# Patient Record
Sex: FEMALE | Race: WHITE | Employment: PART TIME | URBAN - NONMETROPOLITAN AREA
[De-identification: names, ages, dates, MRNs, and addresses within clinical notes are randomized per-mention and may not be internally consistent; named-entity substitution may affect disease eponyms.]

---

## 2017-02-15 ENCOUNTER — NURSE TRIAGE (OUTPATIENT)
Dept: ADMINISTRATIVE | Age: 47
End: 2017-02-15

## 2017-02-16 ENCOUNTER — OFFICE VISIT (OUTPATIENT)
Dept: CARDIOLOGY | Age: 47
End: 2017-02-16

## 2017-02-16 VITALS
SYSTOLIC BLOOD PRESSURE: 130 MMHG | DIASTOLIC BLOOD PRESSURE: 76 MMHG | HEIGHT: 61 IN | BODY MASS INDEX: 29.45 KG/M2 | WEIGHT: 156 LBS

## 2017-02-16 DIAGNOSIS — R42 LIGHT HEADED: ICD-10-CM

## 2017-02-16 DIAGNOSIS — R11.0 NAUSEA: ICD-10-CM

## 2017-02-16 DIAGNOSIS — R10.9 ABDOMINAL PAIN, UNSPECIFIED LOCATION: ICD-10-CM

## 2017-02-16 DIAGNOSIS — R06.02 SOB (SHORTNESS OF BREATH): ICD-10-CM

## 2017-02-16 DIAGNOSIS — R00.2 PALPITATION: Primary | ICD-10-CM

## 2017-02-16 PROCEDURE — 99203 OFFICE O/P NEW LOW 30 MIN: CPT | Performed by: NUCLEAR MEDICINE

## 2017-02-16 RX ORDER — PANTOPRAZOLE SODIUM 40 MG/1
40 TABLET, DELAYED RELEASE ORAL DAILY
Qty: 30 TABLET | Refills: 6 | Status: CANCELLED | OUTPATIENT
Start: 2017-02-16

## 2017-02-16 RX ORDER — PANTOPRAZOLE SODIUM 40 MG/1
40 TABLET, DELAYED RELEASE ORAL DAILY
Qty: 30 TABLET | Refills: 6 | Status: SHIPPED | OUTPATIENT
Start: 2017-02-16 | End: 2018-12-31

## 2017-02-16 ASSESSMENT — ENCOUNTER SYMPTOMS
CHEST TIGHTNESS: 0
ABDOMINAL PAIN: 0
ANAL BLEEDING: 0
CONSTIPATION: 0
SHORTNESS OF BREATH: 0
BLOOD IN STOOL: 0
NAUSEA: 0
PHOTOPHOBIA: 0
BACK PAIN: 0
ABDOMINAL DISTENTION: 0
DIARRHEA: 0
FACIAL SWELLING: 0

## 2017-03-16 ENCOUNTER — OFFICE VISIT (OUTPATIENT)
Dept: CARDIOLOGY | Age: 47
End: 2017-03-16

## 2017-03-16 VITALS
HEART RATE: 84 BPM | SYSTOLIC BLOOD PRESSURE: 132 MMHG | HEIGHT: 62 IN | WEIGHT: 159.7 LBS | BODY MASS INDEX: 29.39 KG/M2 | DIASTOLIC BLOOD PRESSURE: 80 MMHG

## 2017-03-16 DIAGNOSIS — R00.2 PALPITATION: Primary | ICD-10-CM

## 2017-03-16 PROCEDURE — 99213 OFFICE O/P EST LOW 20 MIN: CPT | Performed by: NUCLEAR MEDICINE

## 2017-03-16 RX ORDER — FLUOXETINE HYDROCHLORIDE 20 MG/1
20 CAPSULE ORAL DAILY
COMMUNITY
End: 2018-12-31

## 2017-03-16 RX ORDER — FLUTICASONE PROPIONATE 50 MCG
1 SPRAY, SUSPENSION (ML) NASAL DAILY
COMMUNITY
End: 2020-08-28 | Stop reason: ALTCHOICE

## 2017-05-14 ENCOUNTER — NURSE TRIAGE (OUTPATIENT)
Dept: ADMINISTRATIVE | Age: 47
End: 2017-05-14

## 2018-06-14 ENCOUNTER — HOSPITAL ENCOUNTER (OUTPATIENT)
Dept: MAMMOGRAPHY | Age: 48
Discharge: HOME | End: 2018-06-14
Payer: COMMERCIAL

## 2018-06-14 DIAGNOSIS — Z12.39 BREAST CANCER SCREENING: ICD-10-CM

## 2018-06-14 PROCEDURE — 77067 SCR MAMMO BI INCL CAD: CPT

## 2018-06-20 ENCOUNTER — HOSPITAL ENCOUNTER (OUTPATIENT)
Dept: WOMENS IMAGING | Age: 48
Discharge: HOME | End: 2018-06-20
Payer: COMMERCIAL

## 2018-06-20 ENCOUNTER — APPOINTMENT (OUTPATIENT)
Dept: WOMENS IMAGING | Age: 48
End: 2018-06-20
Payer: COMMERCIAL

## 2018-06-20 DIAGNOSIS — R92.2 BREAST DENSITY: ICD-10-CM

## 2018-06-20 PROCEDURE — 77065 DX MAMMO INCL CAD UNI: CPT

## 2018-12-14 ENCOUNTER — HOSPITAL ENCOUNTER (OUTPATIENT)
Dept: WOMENS IMAGING | Age: 48
Discharge: HOME | End: 2018-12-14
Payer: COMMERCIAL

## 2018-12-14 DIAGNOSIS — R92.2 BREAST DENSITY: ICD-10-CM

## 2018-12-14 DIAGNOSIS — Z09 FOLLOW-UP EXAM: ICD-10-CM

## 2018-12-14 PROCEDURE — 77065 DX MAMMO INCL CAD UNI: CPT

## 2018-12-14 PROCEDURE — 76642 ULTRASOUND BREAST LIMITED: CPT

## 2018-12-31 ENCOUNTER — HOSPITAL ENCOUNTER (OUTPATIENT)
Dept: WOMENS IMAGING | Age: 48
Discharge: HOME | End: 2018-12-31
Payer: COMMERCIAL

## 2018-12-31 VITALS
DIASTOLIC BLOOD PRESSURE: 84 MMHG | HEART RATE: 96 BPM | SYSTOLIC BLOOD PRESSURE: 133 MMHG | TEMPERATURE: 98.5 F | RESPIRATION RATE: 18 BRPM

## 2018-12-31 DIAGNOSIS — N63.20 LEFT BREAST MASS: ICD-10-CM

## 2018-12-31 PROCEDURE — A4648 IMPLANTABLE TISSUE MARKER: HCPCS

## 2018-12-31 PROCEDURE — 88305 TISSUE EXAM BY PATHOLOGIST: CPT

## 2018-12-31 PROCEDURE — 2709999900 HC NON-CHARGEABLE SUPPLY

## 2018-12-31 PROCEDURE — 19083 BX BREAST 1ST LESION US IMAG: CPT

## 2018-12-31 PROCEDURE — 19084 BX BREAST ADD LESION US IMAG: CPT

## 2018-12-31 PROCEDURE — 77065 DX MAMMO INCL CAD UNI: CPT

## 2018-12-31 PROCEDURE — C1894 INTRO/SHEATH, NON-LASER: HCPCS

## 2019-02-04 ENCOUNTER — HOSPITAL ENCOUNTER (EMERGENCY)
Age: 49
Discharge: HOME | End: 2019-02-04
Payer: COMMERCIAL

## 2019-02-04 ENCOUNTER — APPOINTMENT (OUTPATIENT)
Dept: GENERAL RADIOLOGY | Age: 49
End: 2019-02-04
Payer: COMMERCIAL

## 2019-02-04 VITALS
SYSTOLIC BLOOD PRESSURE: 144 MMHG | RESPIRATION RATE: 15 BRPM | WEIGHT: 145 LBS | DIASTOLIC BLOOD PRESSURE: 90 MMHG | TEMPERATURE: 98.6 F | BODY MASS INDEX: 25.69 KG/M2 | OXYGEN SATURATION: 99 % | HEIGHT: 63 IN | HEART RATE: 86 BPM

## 2019-02-04 DIAGNOSIS — R10.13 DYSPEPSIA: ICD-10-CM

## 2019-02-04 DIAGNOSIS — R07.9 CHEST PAIN, UNSPECIFIED TYPE: Primary | ICD-10-CM

## 2019-02-04 DIAGNOSIS — F41.9 ANXIETY: ICD-10-CM

## 2019-02-04 LAB
ALBUMIN SERPL-MCNC: 4.7 G/DL (ref 3.5–5.1)
ALP BLD-CCNC: 70 U/L (ref 38–126)
ALT SERPL-CCNC: 10 U/L (ref 11–66)
ANION GAP SERPL CALCULATED.3IONS-SCNC: 13 MEQ/L (ref 8–16)
AST SERPL-CCNC: 12 U/L (ref 5–40)
BASOPHILS # BLD: 0.3 %
BASOPHILS ABSOLUTE: 0 THOU/MM3 (ref 0–0.1)
BILIRUB SERPL-MCNC: 0.7 MG/DL (ref 0.3–1.2)
BILIRUBIN DIRECT: < 0.2 MG/DL (ref 0–0.3)
BUN BLDV-MCNC: 20 MG/DL (ref 7–22)
CALCIUM SERPL-MCNC: 9.4 MG/DL (ref 8.5–10.5)
CHLORIDE BLD-SCNC: 102 MEQ/L (ref 98–111)
CO2: 26 MEQ/L (ref 23–33)
CREAT SERPL-MCNC: 0.6 MG/DL (ref 0.4–1.2)
EKG ATRIAL RATE: 111 BPM
EKG P AXIS: 67 DEGREES
EKG P-R INTERVAL: 150 MS
EKG Q-T INTERVAL: 328 MS
EKG QRS DURATION: 74 MS
EKG QTC CALCULATION (BAZETT): 446 MS
EKG R AXIS: 22 DEGREES
EKG VENTRICULAR RATE: 111 BPM
EOSINOPHIL # BLD: 0.8 %
EOSINOPHILS ABSOLUTE: 0.1 THOU/MM3 (ref 0–0.4)
ERYTHROCYTE [DISTWIDTH] IN BLOOD BY AUTOMATED COUNT: 11.9 % (ref 11.5–14.5)
ERYTHROCYTE [DISTWIDTH] IN BLOOD BY AUTOMATED COUNT: 38.1 FL (ref 35–45)
GFR SERPL CREATININE-BSD FRML MDRD: > 90 ML/MIN/1.73M2
GLUCOSE BLD-MCNC: 112 MG/DL (ref 70–108)
HCT VFR BLD CALC: 39.5 % (ref 37–47)
HEMOGLOBIN: 13 GM/DL (ref 12–16)
IMMATURE GRANS (ABS): 0.01 THOU/MM3 (ref 0–0.07)
IMMATURE GRANULOCYTES: 0.1 %
LIPASE: 30.5 U/L (ref 5.6–51.3)
LYMPHOCYTES # BLD: 29.4 %
LYMPHOCYTES ABSOLUTE: 2.2 THOU/MM3 (ref 1–4.8)
MCH RBC QN AUTO: 28.9 PG (ref 26–33)
MCHC RBC AUTO-ENTMCNC: 32.9 GM/DL (ref 32.2–35.5)
MCV RBC AUTO: 87.8 FL (ref 81–99)
MONOCYTES # BLD: 6.1 %
MONOCYTES ABSOLUTE: 0.5 THOU/MM3 (ref 0.4–1.3)
NUCLEATED RED BLOOD CELLS: 0 /100 WBC
OSMOLALITY CALCULATION: 284.6 MOSMOL/KG (ref 275–300)
PLATELET # BLD: 268 THOU/MM3 (ref 130–400)
PMV BLD AUTO: 10.1 FL (ref 9.4–12.4)
POTASSIUM SERPL-SCNC: 3.8 MEQ/L (ref 3.5–5.2)
PREGNANCY, SERUM: NEGATIVE
RBC # BLD: 4.5 MILL/MM3 (ref 4.2–5.4)
SEG NEUTROPHILS: 63.3 %
SEGMENTED NEUTROPHILS ABSOLUTE COUNT: 4.7 THOU/MM3 (ref 1.8–7.7)
SODIUM BLD-SCNC: 141 MEQ/L (ref 135–145)
TOTAL PROTEIN: 7.3 G/DL (ref 6.1–8)
TROPONIN T: < 0.01 NG/ML
TROPONIN T: < 0.01 NG/ML
TSH SERPL DL<=0.05 MIU/L-ACNC: 3.82 UIU/ML (ref 0.4–4.2)
WBC # BLD: 7.5 THOU/MM3 (ref 4.8–10.8)

## 2019-02-04 PROCEDURE — 6360000002 HC RX W HCPCS: Performed by: EMERGENCY MEDICINE

## 2019-02-04 PROCEDURE — 6370000000 HC RX 637 (ALT 250 FOR IP): Performed by: NURSE PRACTITIONER

## 2019-02-04 PROCEDURE — 93010 ELECTROCARDIOGRAM REPORT: CPT | Performed by: INTERNAL MEDICINE

## 2019-02-04 PROCEDURE — 36415 COLL VENOUS BLD VENIPUNCTURE: CPT

## 2019-02-04 PROCEDURE — 84703 CHORIONIC GONADOTROPIN ASSAY: CPT

## 2019-02-04 PROCEDURE — 99285 EMERGENCY DEPT VISIT HI MDM: CPT

## 2019-02-04 PROCEDURE — 83690 ASSAY OF LIPASE: CPT

## 2019-02-04 PROCEDURE — 85025 COMPLETE CBC W/AUTO DIFF WBC: CPT

## 2019-02-04 PROCEDURE — 96374 THER/PROPH/DIAG INJ IV PUSH: CPT

## 2019-02-04 PROCEDURE — 84484 ASSAY OF TROPONIN QUANT: CPT

## 2019-02-04 PROCEDURE — C9113 INJ PANTOPRAZOLE SODIUM, VIA: HCPCS | Performed by: EMERGENCY MEDICINE

## 2019-02-04 PROCEDURE — 84443 ASSAY THYROID STIM HORMONE: CPT

## 2019-02-04 PROCEDURE — 82248 BILIRUBIN DIRECT: CPT

## 2019-02-04 PROCEDURE — 80053 COMPREHEN METABOLIC PANEL: CPT

## 2019-02-04 PROCEDURE — 71046 X-RAY EXAM CHEST 2 VIEWS: CPT

## 2019-02-04 PROCEDURE — 93005 ELECTROCARDIOGRAM TRACING: CPT | Performed by: NURSE PRACTITIONER

## 2019-02-04 RX ORDER — HYDROXYZINE HYDROCHLORIDE 25 MG/1
25-50 TABLET, FILM COATED ORAL 3 TIMES DAILY PRN
Qty: 60 TABLET | Refills: 0 | Status: SHIPPED | OUTPATIENT
Start: 2019-02-04 | End: 2019-02-14

## 2019-02-04 RX ORDER — OMEPRAZOLE 40 MG/1
40 CAPSULE, DELAYED RELEASE ORAL
Qty: 30 CAPSULE | Refills: 0 | Status: SHIPPED | OUTPATIENT
Start: 2019-02-04 | End: 2020-08-28 | Stop reason: ALTCHOICE

## 2019-02-04 RX ORDER — PANTOPRAZOLE SODIUM 40 MG/10ML
40 INJECTION, POWDER, LYOPHILIZED, FOR SOLUTION INTRAVENOUS DAILY
Status: DISCONTINUED | OUTPATIENT
Start: 2019-02-04 | End: 2019-02-04 | Stop reason: HOSPADM

## 2019-02-04 RX ADMIN — PANTOPRAZOLE SODIUM 40 MG: 40 INJECTION, POWDER, FOR SOLUTION INTRAVENOUS at 07:10

## 2019-02-04 RX ADMIN — Medication: at 04:43

## 2019-02-04 ASSESSMENT — ENCOUNTER SYMPTOMS
NAUSEA: 1
DIARRHEA: 1
ABDOMINAL PAIN: 1

## 2019-02-04 ASSESSMENT — PAIN DESCRIPTION - LOCATION: LOCATION: CHEST

## 2019-02-04 ASSESSMENT — PAIN SCALES - GENERAL
PAINLEVEL_OUTOF10: 4

## 2019-02-04 ASSESSMENT — PAIN DESCRIPTION - FREQUENCY: FREQUENCY: CONTINUOUS

## 2019-02-04 ASSESSMENT — PAIN DESCRIPTION - PROGRESSION
CLINICAL_PROGRESSION: NOT CHANGED
CLINICAL_PROGRESSION: NOT CHANGED

## 2019-02-04 ASSESSMENT — HEART SCORE
ECG: 0
ECG: 0

## 2019-07-22 ENCOUNTER — HOSPITAL ENCOUNTER (OUTPATIENT)
Dept: WOMENS IMAGING | Age: 49
Discharge: HOME | End: 2019-07-22
Payer: COMMERCIAL

## 2019-07-22 DIAGNOSIS — N63.0 BREAST MASS: ICD-10-CM

## 2019-07-22 DIAGNOSIS — Z09 FOLLOW-UP EXAM, 3-6 MONTHS SINCE PREVIOUS EXAM: ICD-10-CM

## 2019-07-22 DIAGNOSIS — N63.20 MASS OF LEFT BREAST: ICD-10-CM

## 2019-07-22 DIAGNOSIS — Z09 FOLLOW-UP EXAM: ICD-10-CM

## 2019-07-22 PROCEDURE — 76642 ULTRASOUND BREAST LIMITED: CPT

## 2019-07-22 PROCEDURE — 77065 DX MAMMO INCL CAD UNI: CPT

## 2020-08-10 ENCOUNTER — HOSPITAL ENCOUNTER (OUTPATIENT)
Dept: WOMENS IMAGING | Age: 50
Discharge: HOME | End: 2020-08-10
Payer: COMMERCIAL

## 2020-08-10 PROCEDURE — 77063 BREAST TOMOSYNTHESIS BI: CPT

## 2020-08-28 ENCOUNTER — OFFICE VISIT (OUTPATIENT)
Dept: ENT CLINIC | Age: 50
End: 2020-08-28
Payer: COMMERCIAL

## 2020-08-28 VITALS
WEIGHT: 177 LBS | HEIGHT: 63 IN | TEMPERATURE: 98.1 F | BODY MASS INDEX: 31.36 KG/M2 | SYSTOLIC BLOOD PRESSURE: 116 MMHG | HEART RATE: 74 BPM | DIASTOLIC BLOOD PRESSURE: 72 MMHG | RESPIRATION RATE: 12 BRPM

## 2020-08-28 PROCEDURE — 99203 OFFICE O/P NEW LOW 30 MIN: CPT | Performed by: OTOLARYNGOLOGY

## 2020-08-28 RX ORDER — LEVOCETIRIZINE DIHYDROCHLORIDE 5 MG/1
5 TABLET, FILM COATED ORAL NIGHTLY
COMMUNITY
End: 2022-03-24 | Stop reason: ALTCHOICE

## 2020-08-28 RX ORDER — CHOLECALCIFEROL (VITAMIN D3) 125 MCG
CAPSULE ORAL
COMMUNITY

## 2020-08-28 ASSESSMENT — ENCOUNTER SYMPTOMS
TROUBLE SWALLOWING: 0
SINUS PRESSURE: 0
ABDOMINAL PAIN: 0
COLOR CHANGE: 0
NAUSEA: 0
RHINORRHEA: 0
COUGH: 0
STRIDOR: 0
DIARRHEA: 0
CHOKING: 0
VOMITING: 0
SORE THROAT: 0
CHEST TIGHTNESS: 0
FACIAL SWELLING: 0
APNEA: 0
VOICE CHANGE: 0
WHEEZING: 0
SHORTNESS OF BREATH: 0

## 2020-08-28 NOTE — LETTER
[address]  [place] [address] [facility]  Phone: [phone]  Fax: [phone]    Elda Donald MD        September 7, 2020    Odalis Lord, [address] [facility]    Patient: Radha Polanco   MR Number: [id number]   YOB: 1970   Date of Visit: 8/28/2020     Dear Odalis Lord,    Thank you for the request for consultation for Radha Polanco to me for the evaluation of recurrent vertigo. She also complains of some nasal congestion relieved by elevating the tip of her nose. He has a physical therapy assistant she performed Epley maneuvers on herself, more than once. Below are the relevant portions of my assessment and plan of care. Assessment & Plan   Diagnoses and all orders for this visit:     Diagnosis Orders   1. Episodic peripheral vertigo  Audiometry with tympanometry   2. Pressure sensation in left ear  Audiometry with tympanometry    Several months   3. TMJ (temporomandibular joint syndrome)         The findings were explained and her questions were answered. Options were discussed including Do not fill up stomach for 3 hours before bedtime. Elevate the head of the bed, perhaps with a foam wedge. May take Prilosec 20 mg with supper. She can consult with her Dentist about wearing a splint. We will try to evaluate her vertigo, further starting with an audiogram and tympanometry. I reviewed the report on an outside MRI of the head that reported normal paranasal sinuses. Return for Audiol review. If you have questions, please do not hesitate to call me. I look forward to following Shalonda Paredes along with you.     Sincerely,          Elda Donald MD

## 2020-08-28 NOTE — PROGRESS NOTES
nightly       No current facility-administered medications for this visit. History reviewed. No pertinent past medical history. Past Surgical History:   Procedure Laterality Date     SECTION  10/2009     Family History   Problem Relation Age of Onset    Cancer Neg Hx     Colon Cancer Neg Hx     Breast Cancer Neg Hx      Social History     Tobacco Use    Smoking status: Never Smoker    Smokeless tobacco: Never Used   Substance Use Topics    Alcohol use: No       Subjective:       Review of Systems   Constitutional: Negative for activity change, appetite change, chills, diaphoresis, fatigue, fever and unexpected weight change. HENT: Negative for congestion, dental problem, ear discharge, ear pain, facial swelling, hearing loss, mouth sores, nosebleeds, postnasal drip, rhinorrhea, sinus pressure, sneezing, sore throat, tinnitus, trouble swallowing and voice change. Eyes: Negative for visual disturbance. Respiratory: Negative for apnea, cough, choking, chest tightness, shortness of breath, wheezing and stridor. Cardiovascular: Negative for chest pain, palpitations and leg swelling. Gastrointestinal: Negative for abdominal pain, diarrhea, nausea and vomiting. Endocrine: Negative for cold intolerance, heat intolerance, polydipsia and polyuria. Genitourinary: Negative for dysuria, enuresis and hematuria. Musculoskeletal: Negative for arthralgias, gait problem, neck pain and neck stiffness. Skin: Negative for color change and rash. Allergic/Immunologic: Negative for environmental allergies, food allergies and immunocompromised state. Neurological: Negative for dizziness, syncope, facial asymmetry, speech difficulty, light-headedness and headaches. Hematological: Negative for adenopathy. Does not bruise/bleed easily. Psychiatric/Behavioral: Negative for confusion and sleep disturbance. The patient is not nervous/anxious.         Objective:     /72 (Site: Left Upper Arm, Position: Sitting)   Pulse 74   Temp 98.1 °F (36.7 °C) (Infrared)   Resp 12   Ht 5' 2.5\" (1.588 m)   Wt 177 lb (80.3 kg)   BMI 31.86 kg/m²     Physical Exam  Vitals signs and nursing note reviewed. Constitutional:       Appearance: She is well-developed. HENT:      Head: Normocephalic and atraumatic. No laceration. Comments:        Right Ear: Hearing, ear canal and external ear normal. No drainage or swelling. No middle ear effusion. Tympanic membrane is not perforated or erythematous. Left Ear: Hearing, tympanic membrane, ear canal and external ear normal. No drainage or swelling. No middle ear effusion. Tympanic membrane is not perforated or erythematous. Nose: Nose normal. No septal deviation, mucosal edema or rhinorrhea. Mouth/Throat:      Mouth: Mucous membranes are not pale and not dry. No oral lesions. Pharynx: Oropharynx is clear. Uvula midline. No oropharyngeal exudate or posterior oropharyngeal erythema. Comments: LIps: lips normal    Marked TMJ crepitus   Mallampati 1  Base of tongue: symmetric,  Eyes:      Extraocular Movements:      Right eye: Normal extraocular motion and no nystagmus. Left eye: Normal extraocular motion and no nystagmus. Comments: Conjugate gaze   Neck:      Musculoskeletal: Neck supple. Thyroid: No thyroid mass or thyromegaly. Trachea: Phonation normal. No tracheal deviation. Comments:   Salivary glands not enlarged and normal to palpation    Pulmonary:      Effort: Pulmonary effort is normal. No retractions. Breath sounds: No stridor. Neurological:      Mental Status: She is alert and oriented to person, place, and time. Cranial Nerves: No cranial nerve deficit (VIIth N function intact bilat). Psychiatric:         Mood and Affect: Mood and affect normal.         Behavior: Behavior is cooperative.      Hallpike negative for BPPV    Data:  All of the past medical history, past surgical history, family history,social history, allergies and current medications were reviewed with the patient. Assessment & Plan   Diagnoses and all orders for this visit:     Diagnosis Orders   1. Episodic peripheral vertigo  Audiometry with tympanometry   2. Pressure sensation in left ear  Audiometry with tympanometry    Several months   3. TMJ (temporomandibular joint syndrome)         The findings were explained and her questions were answered. Options were discussed including Do not fill up stomach for 3 hours before bedtime. Elevate the head of the bed, perhaps with a foam wedge. May take Prilosec 20 mg with supper. She can consult with her Dentist about wearing a splint. We will try to evaluate her vertigo, further starting with an audiogram and tympanometry. I reviewed the report on an outside MRI of the head that reported normal paranasal sinuses. Return for Audiol review. Marin ZAYAS MondayJOCELINE (JUNIE), am scribing for, and in the presence of Dr. Danielle Najera. Electronically signed by JOCELINE [facility]) on 8/28/20 at 9:43 AM EDT. (Please note that portions of this note were completed with a voice recognition program. Efforts were made to edit the dictations butoccasionally words are mis-transcribed.)    I agree to the above documentation placed by my scribe. I have personally evaluated this patient. Additional findings are as noted. I reviewed the scribe's note and agree with the documented findings and plan of care. Any areas of disagreement are corrected. I agree with the chief complaint, past medical history, past surgical history, allergies, medications, social and family history as documented unless otherwise noted below.      Electronically signed by Vanessa Land MD on 9/7/2020 at 6:08 PM

## 2020-08-28 NOTE — LETTER
[address]  [place] [address] [facility]  Phone: [phone]  Fax: [phone]    Vicky Kirkland MD        September 7, 2020    Toya Freed, [address] [place] [address]    Patient: Alisa Moon   MR Number: [id number]   YOB: 1970   Date of Visit: 8/28/2020     Dear Toya Freed,    Thank you for the request for consultation for Alisa Moon to me for the evaluation of ***. Below are the relevant portions of my assessment and plan of care. Assessment & Plan   Diagnoses and all orders for this visit:     Diagnosis Orders   1. TMJ (temporomandibular joint syndrome)     2. Pressure sensation in left ear  Audiometry with tympanometry    Several months   3. Episodic peripheral vertigo  Audiometry with tympanometry       The findings were explained and her questions were answered. Options were discussed including Do not fill up stomach for 3 hours before bedtime. Elevate the head of the bed, perhaps with a foam wedge. May take Prilosec 20 mg with supper. She can consult with her Dentist about wearing a splint. We will try to evaluate her vertigo, further starting with an audiogram and tympanometry. I reviewed the report on an outside MRI of the head that reported normal paranasal sinuses. Return for Audiol review. If you have questions, please do not hesitate to call me. I look forward to following Robin Mays along with you.     Sincerely,          Vicky Kirkland MD

## 2020-11-03 ENCOUNTER — HOSPITAL ENCOUNTER (OUTPATIENT)
Dept: AUDIOLOGY | Age: 50
Discharge: HOME | End: 2020-11-03
Payer: COMMERCIAL

## 2020-11-03 ENCOUNTER — TELEPHONE (OUTPATIENT)
Dept: ENT CLINIC | Age: 50
End: 2020-11-03

## 2020-11-03 PROCEDURE — 92557 COMPREHENSIVE HEARING TEST: CPT | Performed by: AUDIOLOGIST

## 2020-11-03 PROCEDURE — 92567 TYMPANOMETRY: CPT | Performed by: AUDIOLOGIST

## 2020-11-03 NOTE — PROGRESS NOTES
AUDIOLOGICAL EVALUATION      REASON FOR TESTING:  Audiogram and tympanogram due to recent episode of vertigo and otalgia. The patient explains that both the vertigo and otalgia have resolved. She contemplated cancelling today's appointment. OTOSCOPY: WNL for both ears. AUDIOGRAM    Reliability: Good  Audiometer Used:  [id number]    COMMENTS: Normal hearing sensitivity, sloping to borderline normal/slight sensorineural hearing loss for both ears. Speech discrimination ability is excellent, bilaterally. Tympanometry revealed normal peak pressure and normal middle ear compliance for both ears. RECOMMENDATION(S):   1- Continue care with Dr. Luda Sauer today, as scheduled. 2- Repeat audiogram and tympanogram if any changes are noted in hearing ability.

## 2020-11-03 NOTE — TELEPHONE ENCOUNTER
Patient had her hearing test done and they advised her that it was normal.  She is no longer having any vertigo and would like to cancel her appt for today. She will call back if she needs an appt in the future.

## 2021-02-10 ENCOUNTER — PROCEDURE VISIT (OUTPATIENT)
Dept: NEUROLOGY | Age: 51
End: 2021-02-10
Payer: COMMERCIAL

## 2021-02-10 DIAGNOSIS — R20.0 BILATERAL LEG NUMBNESS: ICD-10-CM

## 2021-02-10 DIAGNOSIS — M54.16 LUMBAR RADICULOPATHY: Primary | ICD-10-CM

## 2021-02-10 PROCEDURE — 95910 NRV CNDJ TEST 7-8 STUDIES: CPT | Performed by: PSYCHIATRY & NEUROLOGY

## 2021-02-10 PROCEDURE — 95886 MUSC TEST DONE W/N TEST COMP: CPT | Performed by: PSYCHIATRY & NEUROLOGY

## 2021-03-24 ENCOUNTER — INITIAL CONSULT (OUTPATIENT)
Dept: NEUROLOGY | Age: 51
End: 2021-03-24
Payer: COMMERCIAL

## 2021-03-24 VITALS
WEIGHT: 167 LBS | HEIGHT: 62 IN | SYSTOLIC BLOOD PRESSURE: 128 MMHG | BODY MASS INDEX: 30.73 KG/M2 | HEART RATE: 70 BPM | DIASTOLIC BLOOD PRESSURE: 82 MMHG

## 2021-03-24 DIAGNOSIS — R20.0 NUMBNESS: Primary | ICD-10-CM

## 2021-03-24 PROCEDURE — 99204 OFFICE O/P NEW MOD 45 MIN: CPT | Performed by: PSYCHIATRY & NEUROLOGY

## 2021-03-24 RX ORDER — LEVOTHYROXINE SODIUM 0.05 MG/1
50 TABLET ORAL DAILY
COMMUNITY

## 2021-03-24 RX ORDER — PAROXETINE HYDROCHLORIDE 20 MG/1
TABLET, FILM COATED ORAL
COMMUNITY
Start: 2021-02-05 | End: 2022-03-24 | Stop reason: ALTCHOICE

## 2021-03-24 NOTE — LETTER
[address]  Dept: [phone]  Dept Fax: [phone]  Loc: [phone]    Thony Davidson MD        3/24/2021      Patient:  Caesar Isaac  MRN:  [id number]  YOB: 1970  Date of Visit:  3/24/2021    Dear Dr. Ada Staples,    Thank you for referring Ebony Mary to me for consultation. Please see attached visit summary with my findings. If you have any questions, please do not hesitate to call me.       Sincerely,         Thony Davidson MD

## 2021-03-24 NOTE — PATIENT INSTRUCTIONS
1. EBV, CMV, VZV screen. 2. Connective tissue screen and SPEP. 3. COVID-19 Ab  4. BMP, serum Copper and ferritin and Iron. 5. Need to obtain MRI T spine and L spine. 6. Call with any new symptoms or concerns. 7. Follow up in 1 months.

## 2021-03-24 NOTE — PROGRESS NOTES
Chief Complaint   Patient presents with    Consultation     numbness       Referring Physician: Dr. Alejandra Ling is a 53 y.o. female who presents today for evaluation of numbness in the lower extremities and trunk, with onset 12/29/2020, while standing, felt both legs calves were sore on that day, then symptoms radiated to from the knees down after days, gradually. Several days later the symptoms of numbness were around the lower trunk, noticed worse while jumping, then can feel it. She also noted the symptoms down the neck at times, symptoms are always symmetric> She reports the symptoms started to involve the inner thigh over the last 2 weeks. Symptoms are intermittent with duration approx 30 minutes. There is a dull feeling, tingling, modifers no incontinence of bowel or bladder. No vision changes. No rash, no flu like illness, no COVID infection or vaccination history. She works as physical therapist. She denies chest pain. No shortness of breath, no neck pain. No vision changes. No dysphagia. No fever. No rash. No weight loss. MRI L and T spine that were unremarkable. Past Medical History:   Diagnosis Date    Hypothyroid        There is no problem list on file for this patient. Allergies   Allergen Reactions    Sulfa Antibiotics        Current Outpatient Medications   Medication Sig Dispense Refill    PARoxetine (PAXIL) 20 MG tablet       levothyroxine (SYNTHROID) 50 MCG tablet Take 50 mcg by mouth Daily      Cholecalciferol (VITAMIN D) 125 MCG (5000 UT) CAPS Take by mouth      levocetirizine (XYZAL) 5 MG tablet Take 5 mg by mouth nightly Indications: takes prn        No current facility-administered medications for this visit.         Social History     Socioeconomic History    Marital status:      Spouse name: None    Number of children: None    Years of education: None    Highest education level: None   Occupational History    None   Social Needs    Financial resource strain: None    Food insecurity     Worry: None     Inability: None    Transportation needs     Medical: None     Non-medical: None   Tobacco Use    Smoking status: Never Smoker    Smokeless tobacco: Never Used   Substance and Sexual Activity    Alcohol use: No    Drug use: No    Sexual activity: Yes     Partners: Male   Lifestyle    Physical activity     Days per week: None     Minutes per session: None    Stress: None   Relationships    Social connections     Talks on phone: None     Gets together: None     Attends Jewish service: None     Active member of club or organization: None     Attends meetings of clubs or organizations: None     Relationship status: None    Intimate partner violence     Fear of current or ex partner: None     Emotionally abused: None     Physically abused: None     Forced sexual activity: None   Other Topics Concern    None   Social History Narrative    None       Family History   Problem Relation Age of Onset    Diabetes Mother     Heart Disease Mother     Heart Disease Father     Thyroid Disease Sister     Cancer Neg Hx     Colon Cancer Neg Hx     Breast Cancer Neg Hx          I reviewed the past medical history, allergies, medications, social history and family history. Review of Systems   All systems reviewed, and are all negative, except what is mentioned in HPI      Vitals:    03/24/21 1400   BP: 128/82   Site: Left Upper Arm   Position: Sitting   Cuff Size: Medium Adult   Pulse: 70   Weight: 167 lb (75.8 kg)   Height: 5' 2\" (1.575 m)       Physical Examination:  General appearance - alert, well appearing, and in no distress, oriented to person, place, and time and overweight  Mental status- Level of Alertness: awake  Orientation: person, place, time  Memory: normal  Fund of Knowledge: normal  Attention/Concentration: normal  Language: normal. Mood is normal.   Neck - supple, no significant adenopathy, carotids upstroke normal bilaterally.    There is no axillary lymphadenopathy. There is no carotid bruit . No neck lymphadenopathy . No thyroid enlargement   Neurological -   Cranial Eekqrs-YM-ZTB:.   Cranial nerve II: Normal   Cranial nerve III: Pupils: equal, round, reactive to light  Cranial nerves III, IV, VI: Extraocular Movements: intact   Cranial nerve V: Facial sensation: intact   Cranial nerve VII:Facial strength: intact   Cranial nerve VIII: Hearing: intact   Cranial nerve IX: Palate Elevation intact bilaterally  Cranial nerve XI: Shoulder shrug intact bilaterally  Cranial nerve XII: Tongue midline   neck supple without rigidity, there is no limitation of range of motion of the neck. DTR's are +2 Intact distal and symmetric  Babinski sign negative  Motor exam is 5/5 in the upper and lower extremities. Normal muscle tone . There is no muscle atrophy. Sensory is intact for light touch, cortical sensation. Coordination: finger to nose, heel to shin intact  Gait and station normal.   Abnormal movement none, vibration normal, proprioception normal  Skin - warm, dry to touch, normal coloration, no rashes, no suspicious skin lesions  Superficial temporal artery pulses are normal.   There is no limitation of range of motion of the neck. There is no resting tremor, no pin rolling, no bradykinesia, no Hypohonia, normal blink rate. Musculoskeletal: Has no hand arthritis, no limitation of ROM in any of the four extremities. There is no leg edema. The Heart was regular in rate and rhythm. No heart murmur  Chest Clear, with good effort. Abdomen soft, intact bowel sounds. Results for orders placed during the hospital encounter of 02/15/17   CT Head W/O contrast    Narrative PROCEDURE: CT HEAD WO CONTRAST    CLINICAL INFORMATION: STROKE,  headache    COMPARISON: No prior study.     TECHNIQUE: Noncontrast head CT  All CT scans at this facility use dose modulation, iterative reconstruction, and/or weight-based dosing when appropriate to reduce radiation dose to as low as reasonably achievable. FINDINGS:     Motion artifact mildly limits evaluation. Pericardium appears to be satisfactory. No acute infarct, intracranial hemorrhage, or hydrocephalus is identified. No significant appearing mass effect or midline shift. Visible portions of paranasal sinuses and mastoid air cells show no acute abnormality. 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 The sinuses minimal posterior opacification of the sphenoid sinus is present. Impression  No acute intracranial abnormality is identified. **This report has been created using voice recognition software. It may contain minor errors which are inherent in voice recognition technology. **    Final report electronically signed by Dr. Raymond Spaulding on 2/15/2017 6:44 PM       We reviewed the patient records from referring provider and available information in the EHR     EMG 2/10/2021  Reviewed        ASSESSMENT:      Diagnosis Orders   1. Numbness        This is a 81-year-old female who presents with numbness involving the trunk, into the lower extremities in the form of dysesthesia, her symptoms have been ongoing since late last year, and are intermittent. She denies any bowel or bladder symptoms. There is no motor weakness appreciated. She has intact reflexes, there is no muscle atrophy, no hyperreflexia. There is no sensory level noted in the trunk. The patient underwent MRI of the thoracic, and lumbar spine with contrast that were performed at an outside hospital that were reportedly normal, we will need to obtain the results of that study and review imaging directly. The patient denies any flulike illness, she denies any history of COVID-19 clinically. She reports that her symptoms started to involve the inner thighs over the last 2 weeks. But she also reports that symptoms trends have been improving. She denies any vision changes. I discussed with the patient differential diagnosis of her symptoms being demyelination, myelopathy, post infection, or inflammation. We will also check her iron studies, copper level and screen for connective tissue conditions, serum protein electrophoresis, we will also send the patient for COVID-19 antibody to see if she has had previous infection subclinically unbeknownst to her with this unusual presentation. The patient denies any history of optic neuritis, there is no motor weakness appreciated. She is active physically and works out. I discussed with the patient the results of her EMG of the lower extremities showing mild L5 radiculopathy on the right, this does not explain her symptoms. We may consider further investigation such as obtaining a lumbar puncture for diagnostic purposes screening for demyelination/myelopathy if the MRI thoracic, and lumbar spines in addition to laboratory data to be obtained as ordered were unremarkable. The patient asked questions reflecting understanding and agreed with the following plan:       Plan    1. EBV, CMV, VZV screen. 2. Connective tissue screen and SPEP. 3. COVID-19 Ab  4. BMP, serum Copper and ferritin and Iron. 5. Need to obtain MRI T spine and L spine. 6. Consider LP to evaluate patient symptoms. 7. Call with any new symptoms or concerns. 8. Follow up in 1 months.            Carleen Rust MD

## 2021-03-30 LAB
A/G RATIO: 1.6 RATIO (ref 1.2–1.9)
ALBUMIN PERCENT: 61 %
ALBUMIN SERPL-MCNC: 4.6 G/DL (ref 2.8–4.9)
ALPHA 1 PERCENT: 4 %
ALPHA 2 PERCENT: 10 %
ALPHA-1-GLOBULIN: 0.3 G/DL (ref 0.2–0.5)
ALPHA-2-GLOBULIN: 0.7 G/DL (ref 0.5–1)
ANTI-NUCLEAR ANTIBODY (ANA): NEGATIVE
BETA PERCENT: 10 %
BETA: 0.8 G/DL (ref 0.5–1.2)
C-REACTIVE PROTEIN: 1 MG/DL (ref 0–0.74)
COPPER, SERUM: 1223 MCG/L (ref 810–1990)
DATE OF ONSET: ABNORMAL
DSDNA ANTIBODY: 1 IU/ML
EMPLOYED IN HEALTHCARE: YES
EPSTEIN BARR VIRUS EARLY AB IGG: >8 AI
EPSTEIN BARR VIRUS NUCLEAR AB IGG: >8 AI
EPSTEIN-BARR VCA IGG: >8 AI
EPSTEIN-BARR VCA IGM: <0.2 AI
FERRITIN: 50 NG/ML (ref 11–307)
FIRST TEST: YES
GAMMA GLOBULIN %: 15 %
GAMMA: 1.1 G/DL (ref 0.7–1.5)
GLOBULIN: 2.9 G/DL (ref 2–3.8)
HOSPITALIZED: NO
ICU: NO
INTERPRETATION: NORMAL
IRON: 92 UG/DL (ref 50–170)
Lab: 0.5 AI
Lab: >8 AI
PREGNANT ?: NO
RESIDES IN CONGREGATE CARE SETTING: NO
SARS-COV-2, IGG: POSITIVE
SEDIMENTATION RATE, ERYTHROCYTE: 25 MM/H (ref 0–20)
SJOGREN'S ANTIBODIES (SSA): 0.7 AI
SJOGREN'S ANTIBODIES (SSB): <0.2 AI
SYMPTOMATIC AS DEFINED BY THE CDC: NO
TOTAL PROTEIN: 7.5 G/DL (ref 6.1–8.3)
VARICELLA ZOSTER AB IGM: 0.92 ISR
VZV IGG SER QL IA: 6.4 AI

## 2021-06-21 ENCOUNTER — OFFICE VISIT (OUTPATIENT)
Dept: NEUROLOGY | Age: 51
End: 2021-06-21
Payer: COMMERCIAL

## 2021-06-21 VITALS
SYSTOLIC BLOOD PRESSURE: 132 MMHG | DIASTOLIC BLOOD PRESSURE: 82 MMHG | HEIGHT: 63 IN | WEIGHT: 175 LBS | BODY MASS INDEX: 31.01 KG/M2 | HEART RATE: 92 BPM

## 2021-06-21 DIAGNOSIS — R20.0 BILATERAL LEG NUMBNESS: ICD-10-CM

## 2021-06-21 DIAGNOSIS — R20.0 NUMBNESS: Primary | ICD-10-CM

## 2021-06-21 PROCEDURE — 99213 OFFICE O/P EST LOW 20 MIN: CPT | Performed by: NURSE PRACTITIONER

## 2021-06-21 NOTE — PATIENT INSTRUCTIONS
1. Obtain MRI thoracic and lumbar spine WO contrast images from [facility]  2. Repeat VZV viral titers  3. Report any new symptoms  4. Follow up in 8 weeks or sooner if needed. 5. Call if any questions or concerns.

## 2021-06-21 NOTE — PROGRESS NOTES
NEUROLOGY OUT PATIENT FOLLOW UP NOTE:  6/21/20212:47 PM    Carol Hale is here for follow up for bilateral lower extremity numbness, trunk numbness. ROS:  Respiratory : no cough, no shortness of breath  Cardiac: no chest pain. No palpitations. Renal : no flank pain, no hematuria    Skin: no rash      Allergies   Allergen Reactions    Sulfa Antibiotics        Current Outpatient Medications:     PARoxetine (PAXIL) 20 MG tablet, , Disp: , Rfl:     levothyroxine (SYNTHROID) 50 MCG tablet, Take 50 mcg by mouth Daily, Disp: , Rfl:     Cholecalciferol (VITAMIN D) 125 MCG (5000 UT) CAPS, Take by mouth, Disp: , Rfl:     levocetirizine (XYZAL) 5 MG tablet, Take 5 mg by mouth nightly Indications: takes prn , Disp: , Rfl:     I reviewed the past medical history, allergies, medications, social history and family history. PE:   Vitals:    06/21/21 1437   BP: 132/82   Site: Left Upper Arm   Position: Sitting   Cuff Size: Medium Adult   Pulse: 92   Weight: 175 lb (79.4 kg)   Height: 5' 2.5\" (1.588 m)     General Appearance:  awake, alert, oriented, in no acute distress  Gen: NAD, Language is Intact. Skin: no rash, lesion, dry  to touch. warm  Head: no rash, no icterus  Neck: There is no carotid bruits. The Neck is supple. There is no neck lymphadenopathy. Neuro: CN 2-12 grossly intact with no focal deficits. Power 5/5 Throughout symmetric, Reflexes are +2 symmetric. Long tracts are intact. Cerebellar exam is Intact. Sensory exam is intact to light touch. Gait is intact. There is no trunk numbness  Musculoskeletal:  Has no hand arthritis, no limitation of ROM in any of the four extremities. Lower extremities no edema  The abdomen is soft,  intact bowel sounds.          DATA:        Results for orders placed or performed in visit on 03/30/21   EMELIA-GREER PANEL   Result Value Ref Range    EMELIA BARR VIRUS EARLY AB IGG >8.0 (H) <0.9 AI    Emelia Barr virus nuclear Ab IgG >8.0 (H) <0.9 AI    EBV VCA IgG HEAD WO CONTRAST    CLINICAL INFORMATION: STROKE,  headache    COMPARISON: No prior study. TECHNIQUE: Noncontrast head CT  All CT scans at this facility use dose modulation, iterative reconstruction, and/or weight-based dosing when appropriate to reduce radiation dose to as low as reasonably achievable. FINDINGS:    Motion artifact mildly limits evaluation. Pericardium appears to be satisfactory. No acute infarct, intracranial hemorrhage, or hydrocephalus is identified. No significant appearing mass effect or midline shift. Visible portions of paranasal sinuses and mastoid air cells show no acute abnormality. 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 The sinuses minimal posterior opacification of the sphenoid sinus is present. Impression  No acute intracranial abnormality is identified. **This report has been created using voice recognition software. It may contain minor errors which are inherent in voice recognition technology. **    Final report electronically signed by Dr. Abran Hdz on 2/15/2017 6:44 PM         Assessment:     Diagnosis Orders   1. Numbness     2. Bilateral leg numbness          She reports her numbness has significantly improved. Her trunk numbness has resolved. She still has intermittent episodes of numbness in her bilateral shins and toes. She denies any incontience of bowel or bladder. I shared with the patient that her MRI thoracic and lumbar spine Wo contrast done at [facility] showed no concerning findings, per the report. However, I do not have these images available for review. We will attempt to obtain these images for further review. Symptoms most likely related to post viral syndrome as her VZV IgM antibody was indeterminate and her COVID antibody was positive.   Her other lab work done checking iron studies, connective tissue screening, copper level, SPEP showed no concerning

## 2021-06-24 LAB
VARICELLA ZOSTER AB IGM: 0.67 ISR
VZV IGG SER QL IA: 5.9 AI

## 2021-10-12 ENCOUNTER — HOSPITAL ENCOUNTER (OUTPATIENT)
Dept: MAMMOGRAPHY | Age: 51
Discharge: HOME | End: 2021-10-12
Payer: COMMERCIAL

## 2021-10-12 DIAGNOSIS — Z12.39 ENCOUNTER FOR OTHER SCREENING FOR MALIGNANT NEOPLASM OF BREAST: ICD-10-CM

## 2021-10-12 PROCEDURE — 77063 BREAST TOMOSYNTHESIS BI: CPT

## 2021-11-04 ENCOUNTER — NURSE ONLY (OUTPATIENT)
Dept: LAB | Age: 51
End: 2021-11-04

## 2021-11-04 LAB — TSH SERPL DL<=0.05 MIU/L-ACNC: 2.98 UIU/ML (ref 0.4–4.2)

## 2022-01-07 ENCOUNTER — HOSPITAL ENCOUNTER (OUTPATIENT)
Dept: NON INVASIVE DIAGNOSTICS | Age: 52
Discharge: HOME | End: 2022-01-07
Payer: COMMERCIAL

## 2022-01-07 DIAGNOSIS — R00.2 PALPITATIONS: ICD-10-CM

## 2022-01-07 PROCEDURE — 93270 REMOTE 30 DAY ECG REV/REPORT: CPT

## 2022-01-07 NOTE — PROCEDURES
The skin was prepped and a 30 day cardiac event monitor was applied. The patient was instructed on the documentation of symptoms and the purpose of the monitor as well as the things to avoid while wearing the monitor. The patient was instructed to remove and return the monitor on 02/06/2022.   The serial number of the monitor that was applied is [id number]

## 2022-02-07 NOTE — PROCEDURES
[address] [place] [address]                                 EVENT MONITOR    PATIENT NAME: Teresa Sellers                    :        1970  MED REC NO:   [id number]                           ROOM:  ACCOUNT NO:   [de-identified]                           ADMIT DATE: 2022  PROVIDER:     Meryl Phan M.D. CLINICAL HISTORY AND INDICATION:  This is a patient with palpitation. EVENT MONITOR DESCRIPTION:  Event monitor was attached to the patient  between 2022 and 2022. EVENT MONITOR FINDINGS:  Baseline rhythm showed sinus rhythm with rare  PVCs and as well as PACs. Episodes of ventricular couplets were noted  on few occasions. Otherwise no sustained arrhythmias. CONCLUSION:  1. Sinus rhythm with occasional premature ventricular beats noted. 2.  Otherwise no sustained arrhythmias. Clinical correlation is  recommended.         Chavez Klein M.D.    D: 2022 7:20:27       T: 2022 8:41:05     MEDARDO/[email or web address]  Job#: [id number]     Doc#: [id number]    CC:

## 2022-03-24 ENCOUNTER — OFFICE VISIT (OUTPATIENT)
Dept: CARDIOLOGY CLINIC | Age: 52
End: 2022-03-24
Payer: COMMERCIAL

## 2022-03-24 VITALS
DIASTOLIC BLOOD PRESSURE: 80 MMHG | SYSTOLIC BLOOD PRESSURE: 142 MMHG | HEART RATE: 93 BPM | BODY MASS INDEX: 34.96 KG/M2 | WEIGHT: 190 LBS | HEIGHT: 62 IN

## 2022-03-24 DIAGNOSIS — R00.2 PALPITATIONS: Primary | ICD-10-CM

## 2022-03-24 PROCEDURE — 99204 OFFICE O/P NEW MOD 45 MIN: CPT | Performed by: NUCLEAR MEDICINE

## 2022-03-24 PROCEDURE — 93000 ELECTROCARDIOGRAM COMPLETE: CPT | Performed by: NUCLEAR MEDICINE

## 2022-03-24 ASSESSMENT — ENCOUNTER SYMPTOMS
ABDOMINAL DISTENTION: 0
BACK PAIN: 0
CHEST TIGHTNESS: 0
DIARRHEA: 0
COLOR CHANGE: 0
ABDOMINAL PAIN: 0
RECTAL PAIN: 0
SHORTNESS OF BREATH: 0
ANAL BLEEDING: 0
PHOTOPHOBIA: 0
NAUSEA: 0
CONSTIPATION: 0
BLOOD IN STOOL: 0
VOMITING: 0

## 2022-03-24 NOTE — PROGRESS NOTES
[address]  [address]  [facility] [address]  Dept: [phone]  Dept Fax: [phone]  Loc: [phone]    Visit Date: 3/24/2022    Justyn Sadler is a 49 y.o. female who presents todayfor:  Chief Complaint   Patient presents with    New Patient     EKG done today    Establish Cardiologist    Palpitations     Not seen in 5 years   Lately had some more palpitation   Intermittent in nature  Almost daily   Had event monitor   No arrhythmia   Some PVCs  Does have some fluctuation on the apple watch   Lately it got better by itself  No known CAD before  No chest pain   No changes in breathing  BP is stable   No smoking   Family history of CAD     HPI:  HPI  Past Medical History:   Diagnosis Date    Hypothyroid       Past Surgical History:   Procedure Laterality Date     SECTION  10/2009    US BREAST BIOPSY NEEDLE ADDITIONAL LEFT Left 2018    neg    US BREAST NEEDLE BIOPSY LEFT Left     neg    US BREAST NEEDLE BIOPSY LEFT Left     neg     Family History   Problem Relation Age of Onset    Diabetes Mother     Heart Disease Mother     Heart Disease Father     Thyroid Disease Sister     Cancer Neg Hx     Colon Cancer Neg Hx     Breast Cancer Neg Hx      Social History     Tobacco Use    Smoking status: Never Smoker    Smokeless tobacco: Never Used   Substance Use Topics    Alcohol use: No      Current Outpatient Medications   Medication Sig Dispense Refill    levothyroxine (SYNTHROID) 50 MCG tablet Take 50 mcg by mouth Daily      Cholecalciferol (VITAMIN D) 125 MCG (5000 UT) CAPS Take by mouth       No current facility-administered medications for this visit.      Allergies   Allergen Reactions    Sulfa Antibiotics      Health Maintenance   Topic Date Due    Hepatitis C screen  Never done    COVID-19 Vaccine (1) Never done    Depression Screen  Never done    HIV screen  Never done    DTaP/Tdap/Td vaccine (1 - Tdap) Never done    Colorectal Cancer Screen  Never done    Shingles Vaccine (1 of 2) Never done    Flu vaccine (1) Never done    Diabetes screen  10/02/2021    Lipid screen  10/02/2023    Breast cancer screen  10/12/2023    Hepatitis A vaccine  Aged Out    Hepatitis B vaccine  Aged Out    Hib vaccine  Aged Out    Meningococcal (ACWY) vaccine  Aged Out    Pneumococcal 0-64 years Vaccine  Aged Out       Subjective:  Review of Systems   Constitutional: Positive for fatigue. HENT: Negative for ear discharge and hearing loss. Eyes: Negative for photophobia. Respiratory: Negative for chest tightness and shortness of breath. Cardiovascular: Positive for palpitations. Negative for chest pain. Gastrointestinal: Negative for abdominal distention, abdominal pain, anal bleeding, blood in stool, constipation, diarrhea, nausea, rectal pain and vomiting. Endocrine: Negative for polyphagia. Genitourinary: Negative for dysuria and frequency. Musculoskeletal: Negative for arthralgias, back pain, gait problem, joint swelling, myalgias, neck pain and neck stiffness. Skin: Negative for color change, pallor, rash and wound. Allergic/Immunologic: Negative for food allergies. Neurological: Negative for dizziness, syncope and light-headedness. Psychiatric/Behavioral: Negative for confusion, decreased concentration, dysphoric mood and hallucinations. The patient is not nervous/anxious and is not hyperactive. Objective:  Physical Exam  HENT:      Head: Normocephalic. Right Ear: Tympanic membrane normal.      Nose: Nose normal.      Mouth/Throat:      Mouth: Mucous membranes are moist.   Eyes:      Pupils: Pupils are equal, round, and reactive to light. Cardiovascular:      Rate and Rhythm: Normal rate and regular rhythm. Heart sounds: Murmur heard. No gallop. Pulmonary:      Effort: No respiratory distress. Breath sounds: No stridor. No wheezing, rhonchi or rales. Chest:      Chest wall: No tenderness. Abdominal:      General: There is no distension. Palpations: There is no mass. Tenderness: There is no abdominal tenderness. There is no right CVA tenderness, left CVA tenderness, guarding or rebound. Hernia: No hernia is present. Musculoskeletal:         General: No swelling, tenderness, deformity or signs of injury. Cervical back: Normal range of motion. Right lower leg: No edema. Left lower leg: No edema. Skin:     Coloration: Skin is not jaundiced or pale. Findings: No bruising, erythema, lesion or rash. Neurological:      Mental Status: She is alert. Cranial Nerves: No cranial nerve deficit. Sensory: No sensory deficit. Motor: No weakness. Coordination: Coordination normal.      Gait: Gait normal.      Deep Tendon Reflexes: Reflexes normal.   Psychiatric:         Mood and Affect: Mood normal.         Thought Content: Thought content normal.       BP (!) 142/80   Pulse 93   Ht 5' 2\" (1.575 m)   Wt 190 lb (86.2 kg)   LMP 01/26/2017   BMI 34.75 kg/m²     Assessment:      Diagnosis Orders   1. Palpitations  EKG 12 Lead   as above  Risk for CAD  PVCs are symptomatic   No angina   Feels better now   ECG in office was done today. I reviewed the ECG. No acute findings      Plan:  No follow-ups on file. Discussed   re assurance  Consider linq if needed  Consider more work up if needed  Continue risk factor modification and medical management  Thank you for allowing me to participate in the care of your patient. Please don't hesitate to contact me regarding any further issues related to the patient care    Orders Placed:  Orders Placed This Encounter   Procedures    EKG 12 Lead     Order Specific Question:   Reason for Exam?     Answer: Other       Medications Prescribed:  No orders of the defined types were placed in this encounter. Discussed use, benefit, and side effects of prescribed medications. All patient questions answered. Pt voicedunderstanding. Instructed to continue current medications, diet and exercise. Continue risk factor modification and medical management. Patient agreed with treatment plan. Follow up as directed.     Electronically signedby Miriam Covington MD on 3/24/2022 at 8:32 AM

## 2022-07-20 ENCOUNTER — HOSPITAL ENCOUNTER (OUTPATIENT)
Age: 52
Discharge: HOME | End: 2022-07-20

## 2022-07-20 LAB — RUBELLA: 477.7 IU/ML

## 2022-07-22 LAB
MUMPS IGG TITER: 8.31
VZV IGG SER QL IA: 2.87

## 2022-07-23 LAB — RUBEOLA IGG: 48.1 AU/ML

## 2022-10-04 ENCOUNTER — OFFICE VISIT (OUTPATIENT)
Dept: CARDIOLOGY CLINIC | Age: 52
End: 2022-10-04
Payer: COMMERCIAL

## 2022-10-04 VITALS
HEIGHT: 62 IN | SYSTOLIC BLOOD PRESSURE: 136 MMHG | BODY MASS INDEX: 31.28 KG/M2 | HEART RATE: 89 BPM | DIASTOLIC BLOOD PRESSURE: 81 MMHG | WEIGHT: 170 LBS

## 2022-10-04 DIAGNOSIS — I49.3 PVC (PREMATURE VENTRICULAR CONTRACTION): Primary | ICD-10-CM

## 2022-10-04 PROCEDURE — 99213 OFFICE O/P EST LOW 20 MIN: CPT | Performed by: NUCLEAR MEDICINE

## 2022-10-04 NOTE — PROGRESS NOTES
[facility]  [address] [address]  Dept: [phone]  Dept Fax: [phone]  Loc: [phone]    Visit Date: 10/4/2022    Nelly Montejo is a 47 y.o. female who presents todayfor:  Chief Complaint   Patient presents with    Follow-up    Palpitations   Palpitation  Is much better  No chest pain  No changes in breathing  BP is stable   No other medical problems       HPI:  HPI  Past Medical History:   Diagnosis Date    Hypothyroid       Past Surgical History:   Procedure Laterality Date     SECTION  10/2009    US BREAST BIOPSY NEEDLE ADDITIONAL LEFT Left     neg    US BREAST NEEDLE BIOPSY LEFT Left     neg    US BREAST NEEDLE BIOPSY LEFT Left     neg     Family History   Problem Relation Age of Onset    Diabetes Mother     Heart Disease Mother     Heart Disease Father     Thyroid Disease Sister     Cancer Neg Hx     Colon Cancer Neg Hx     Breast Cancer Neg Hx      Social History     Tobacco Use    Smoking status: Never    Smokeless tobacco: Never   Substance Use Topics    Alcohol use: No      Current Outpatient Medications   Medication Sig Dispense Refill    levothyroxine (SYNTHROID) 50 MCG tablet Take 50 mcg by mouth Daily      Cholecalciferol (VITAMIN D) 125 MCG (5000 UT) CAPS Take by mouth       No current facility-administered medications for this visit.      Allergies   Allergen Reactions    Sulfa Antibiotics      Health Maintenance   Topic Date Due    COVID-19 Vaccine (1) Never done    Depression Screen  Never done    HIV screen  Never done    Hepatitis C screen  Never done    DTaP/Tdap/Td vaccine (1 - Tdap) Never done    Cervical cancer screen  Never done    Colorectal Cancer Screen  Never done    Shingles vaccine (1 of 2) Never done    Diabetes screen  10/02/2021    Flu vaccine (1) Never done    Lipids  10/02/2023    Breast cancer screen  10/12/2023    Hepatitis A vaccine  Aged Out    Hepatitis B vaccine  Aged Out Hib vaccine  Aged Out    Meningococcal (ACWY) vaccine  Aged Out    Pneumococcal 0-64 years Vaccine  Aged Out       Subjective:  Review of Systems  General:   No fever, no chills, No fatigue or weight loss  Pulmonary:    No dyspnea, no wheezing  Cardiac:    Denies recent chest pain,   GI:     No nausea or vomiting, no abdominal pain  Neuro:    No dizziness or light headedness,   Musculoskeletal:  No recent active issues  Extremities:   No edema, no obvious claudication     Objective:  Physical Exam  /81   Pulse 89   Ht 5' 2\" (1.575 m)   Wt 170 lb (77.1 kg)   LMP 01/26/2017   BMI 31.09 kg/m²   General:   Well developed, well nourished  Lungs:   Clear to auscultation  Heart:    Normal S1 S2, Slight murmur. no rubs, no gallops  Abdomen:   Soft, non tender, no organomegalies, positive bowel sounds  Extremities:   No edema, no cyanosis, good peripheral pulses  Neurological:   Awake, alert, oriented. No obvious focal deficits  Musculoskelatal:  No obvious deformities    Assessment:      Diagnosis Orders   1. PVC (premature ventricular contraction)        As above  Cardiac fair for now     Plan:  No follow-ups on file. As above  Continue risk factor modification and medical management  Thank you for allowing me to participate in the care of your patient. Please don't hesitate to contact me regarding any further issues related to the patient care    Orders Placed:  No orders of the defined types were placed in this encounter. Medications Prescribed:  No orders of the defined types were placed in this encounter. Discussed use, benefit, and side effects of prescribed medications. All patient questions answered. Pt voicedunderstanding. Instructed to continue current medications, diet and exercise. Continue risk factor modification and medical management. Patient agreed with treatment plan. Follow up as directed.     Electronically signedby Rah Mcdonough MD on 10/4/2022 at 8:00 AM

## 2022-10-21 ENCOUNTER — HOSPITAL ENCOUNTER (OUTPATIENT)
Dept: MAMMOGRAPHY | Age: 52
Discharge: HOME | End: 2022-10-21
Payer: COMMERCIAL

## 2022-10-21 DIAGNOSIS — Z12.31 VISIT FOR SCREENING MAMMOGRAM: ICD-10-CM

## 2022-10-21 PROCEDURE — 77063 BREAST TOMOSYNTHESIS BI: CPT

## 2022-11-10 NOTE — PROGRESS NOTES
[facility]   Date Of Service: 11/16/2022  Provider: Sim Vagn DO, DO  Name: Jamil Viveros   MRN: [id number]    Chief Complaint(s)      Chief Complaint   Patient presents with    New Patient          History of Present illness (HPI)    Jamil Viveros   is a(n)52 y.o. female with a hx of hypothyroidism, vitamin D deficiency referred by Omar Leonard NP for evaluation of  + LES, Sjogren syndrome, lab abnoramlities     Arthralgias in bilateral hands affecting the PIP joints with reported swelling and pain along the right 5th dip starting around 2020. Pt then developed burning senastion in her stomach, back, thighs, b/l ankles, and toes. Evaluation by neurology was negative around Jan 2021. EMG/NCV unrevealing. MRI lumbar and thoracic spine w/o concerning abnormalities w/  viral testing. Symptoms were believed to be most likely related to post viral syndrome as her VZV IgM antibody was indeterminate and her COVID antibody was positive. Her other lab work done checking iron studies, connective tissue screening, copper level, SPEP showed no concerning findings. burning sensation lingered for 6 months then went away. sx's then returned intermittently. Burning sensation returned. Repeat evaluation in January 2022 with + LES, SSA ab's. Intermittent  dry eyes, dry mouth sx's. - gritting sensation of eyes mainly in the AM and \"feels like I am sleeping like my eyes are open\"Denies choking, cavities, vaginal dryness cough, raynauds. Rheumatology evaluation at [facility] April 2022. Testing with + LES 1:160 and SSA ab.      Muscle spasms mainly in the feet intermittent w/ stretching.       -denies Photosenstivity, Rash,  oral/nasal sores, Raynaud's, skin tightening, renal disease,foamy urination, hematuria, sz's, blood clots,  AIHA,leukpenia/lymphopenia, thrombocytopenia, hair loss, serositis, enthesitis, dactylitis, nail changes, hx of STD,  personal or family history of Psoriatic arthritis, psoriasis, ank spond,       Cancer screening:       Review of Systems    Review of Systems   Constitutional:  Positive for fatigue. HENT: Negative. Eyes: Negative. Respiratory: Negative. Genitourinary: Negative. Musculoskeletal: Negative. Skin:  Negative for color change. Neurological:  Positive for numbness. Hematological:  Bruises/bleeds easily. PAST MEDICAL HISTORY     has a past medical history of Hypothyroid. PAST SURGICAL HISTORY     has a past surgical history that includes  section (10/2009); US BREAST BIOPSY W LOC DEVICE 1ST LESION LEFT (Left, ); US BREAST BIOPSY W LOC DEVICE 1ST LESION LEFT (Left, ); and US BREAST BIOPSY W LOC DEVICE EACH ADDL LESION LEFT (Left, 2018). FAMILY HISTORY      Family History   Problem Relation Age of Onset    Diabetes Mother     Heart Disease Mother     Heart Disease Father     Thyroid Disease Sister     Cancer Neg Hx     Colon Cancer Neg Hx     Breast Cancer Neg Hx        SOCIAL HISTORY     reports that she has never smoked. She has never used smokeless tobacco. She reports that she does not drink alcohol and does not use drugs. ALLERGIES     Allergies   Allergen Reactions    Sulfa Antibiotics        CURRENT MEDICATIONS      Current Outpatient Medications:     cabergoline (DOSTINEX) 0.5 MG tablet, Take 0.25 mg by mouth Twice a Week, Disp: , Rfl:     escitalopram (LEXAPRO) 5 MG tablet, Take 5 mg by mouth daily, Disp: , Rfl:     levothyroxine (SYNTHROID) 50 MCG tablet, Take 50 mcg by mouth Daily, Disp: , Rfl:     Cholecalciferol (VITAMIN D) 125 MCG (5000 UT) CAPS, Take by mouth, Disp: , Rfl:     PHYSICAL EXAMINATION / OBJECTIVE     Objective:  /68 (Site: Left Upper Arm, Position: Sitting, Cuff Size: Medium Adult)   Pulse 89   Ht 5' 2.01\" (1.575 m)   Wt 164 lb 8 oz (74.6 kg)   SpO2 98%   BMI 30.08 kg/m²     Physical Exam  Vitals reviewed. Constitutional:       Appearance: Normal appearance.    HENT: Head: Normocephalic. Mouth/Throat:      Mouth: Mucous membranes are moist.   Eyes:      Conjunctiva/sclera: Conjunctivae normal.   Cardiovascular:      Rate and Rhythm: Normal rate. Heart sounds: No murmur heard. Pulmonary:      Effort: Pulmonary effort is normal.      Breath sounds: Normal breath sounds. Musculoskeletal:         General: No swelling or tenderness. Normal range of motion. Skin:     General: Skin is warm. Neurological:      General: No focal deficit present. Mental Status: She is alert and oriented to person, place, and time. LABS        CBC  Lab Results   Component Value Date/Time    WBC 7.5 02/04/2019 04:39 AM    RBC 4.50 02/04/2019 04:39 AM    RBC 4.27 10/02/2018 08:04 AM    HGB 13.0 02/04/2019 04:39 AM    HCT 39.5 02/04/2019 04:39 AM    MCV 87.8 02/04/2019 04:39 AM    MCH 28.9 02/04/2019 04:39 AM    MCHC 32.9 02/04/2019 04:39 AM    RDW 12.0 10/02/2018 08:04 AM     02/04/2019 04:39 AM       CMP  Lab Results   Component Value Date/Time    CALCIUM 9.4 02/04/2019 04:39 AM    LABALBU 4.6 03/30/2021 08:20 AM    PROT 7.5 03/30/2021 08:20 AM     02/04/2019 04:39 AM    K 3.8 02/04/2019 04:39 AM    CO2 26 02/04/2019 04:39 AM     02/04/2019 04:39 AM    BUN 20 02/04/2019 04:39 AM    CREATININE 0.6 02/04/2019 04:39 AM    ALKPHOS 70 02/04/2019 04:39 AM    ALT 10 02/04/2019 04:39 AM    AST 12 02/04/2019 04:39 AM       HgBA1c: No components found for: HGBA1C    Lab Results   Component Value Date    TSH 2.980 11/04/2021     Lab Results   Component Value Date/Time    VITD25 25 10/02/2018 08:04 AM     Lab Results   Component Value Date    DSDNAAB 1 03/30/2021        Lab Results   Component Value Date    SEDRATE 25 (H) 03/30/2021     Lab Results   Component Value Date    CRP 1.0 (H) 03/30/2021     OUTSIDE LABS:     Jan 2022   CBC wnl  Hgba1c 5.2.    Anti-SS-A 1.0  Anti-SS-B <0.2   Negative : Anti-DNA DS, RNP ab, Mcclain ab, anti-scleroderma-70 ab, antichromatin, anti-shilpi-1, anto-centromere B ab     April 2022 - + LES, + SSA ab,     RADIOLOGY:       ASSESSMENT/PLAN      1. LES positive    2. Polyarthralgia    3. Paresthesia    4. Dry eyes      - intermittent arthralgia of hands, migratory intermittent paresthesia, gritty sensation of eyes, intermittent dry mouth. Reported abnormal eye testing previously. + LES and SSA ab. April 2022. - [facility] rheumatology records reviewed and summarized above. - request optometrist records. - discussed sugar free candies, fishoil , biotene mouthwash. , prn eye drops. - holding on prescription medicaiton as symptoms are currently improved and intemrittent   - breifly discussed minor salivery gland biopsy to help confirm the diagnosis of suspected sjogrens. Diagnosis Orders   1. LES positive  CBC with Auto Differential    Comprehensive Metabolic Panel    C-Reactive Protein    Sedimentation Rate    Anti SSA    C3 Complement    C4 Complement    Urinalysis    Protein / creatinine ratio, urine      2. Polyarthralgia  CBC with Auto Differential    Comprehensive Metabolic Panel    C-Reactive Protein    Sedimentation Rate    Anti SSA    C3 Complement    C4 Complement    Urinalysis    Protein / creatinine ratio, urine      3. Paresthesia  CBC with Auto Differential    Comprehensive Metabolic Panel    C-Reactive Protein    Sedimentation Rate    Anti SSA    C3 Complement    C4 Complement    Urinalysis    Protein / creatinine ratio, urine      4. Dry eyes             Return in about 3 months (around 2/16/2023). Electronically signed by Alba Farley DO on 11/16/2022 at 10:02 AM    New Prescriptions    No medications on file       11/16/2022       The risks and benefits of my recommendations, as well as other treatment options, benefits and side effects were discussed with the patient today. Questions were answered. Thank you for allowing me to participate in the care of this patient. Please call if there are any questions.

## 2022-11-16 ENCOUNTER — OFFICE VISIT (OUTPATIENT)
Dept: RHEUMATOLOGY | Age: 52
End: 2022-11-16
Payer: COMMERCIAL

## 2022-11-16 VITALS
HEART RATE: 89 BPM | BODY MASS INDEX: 30.27 KG/M2 | SYSTOLIC BLOOD PRESSURE: 136 MMHG | DIASTOLIC BLOOD PRESSURE: 68 MMHG | HEIGHT: 62 IN | WEIGHT: 164.5 LBS | OXYGEN SATURATION: 98 %

## 2022-11-16 DIAGNOSIS — H04.123 DRY EYES: ICD-10-CM

## 2022-11-16 DIAGNOSIS — R76.8 ANA POSITIVE: Primary | ICD-10-CM

## 2022-11-16 DIAGNOSIS — R20.2 PARESTHESIA: ICD-10-CM

## 2022-11-16 DIAGNOSIS — M25.50 POLYARTHRALGIA: ICD-10-CM

## 2022-11-16 PROCEDURE — 99204 OFFICE O/P NEW MOD 45 MIN: CPT | Performed by: INTERNAL MEDICINE

## 2022-11-16 RX ORDER — ESCITALOPRAM OXALATE 5 MG/1
5 TABLET ORAL DAILY
COMMUNITY

## 2022-11-16 RX ORDER — CABERGOLINE 0.5 MG/1
0.25 TABLET ORAL
COMMUNITY

## 2022-11-16 ASSESSMENT — ENCOUNTER SYMPTOMS
RESPIRATORY NEGATIVE: 1
COLOR CHANGE: 0
EYES NEGATIVE: 1

## 2022-12-16 LAB
ALBUMIN SERPL-MCNC: 4.5 G/DL
ALP BLD-CCNC: 97 U/L
ALT SERPL-CCNC: 14 U/L
ANION GAP SERPL CALCULATED.3IONS-SCNC: 1.9 MMOL/L
AST SERPL-CCNC: 17 U/L
BASOPHILS ABSOLUTE: 0 /ΜL
BASOPHILS RELATIVE PERCENT: 0 %
BILIRUB SERPL-MCNC: 0.3 MG/DL (ref 0.1–1.4)
BILIRUBIN, URINE: NEGATIVE
BLOOD, URINE: NEGATIVE
BUN BLDV-MCNC: 20 MG/DL
C-REACTIVE PROTEIN: 10
CALCIUM SERPL-MCNC: 9.5 MG/DL
CHLORIDE BLD-SCNC: 104 MMOL/L
CHOLESTEROL, TOTAL: 251 MG/DL
CHOLESTEROL/HDL RATIO: 4.8
CLARITY: CLEAR
CO2: NORMAL
COLOR: YELLOW
CREAT SERPL-MCNC: 1.27 MG/DL
EOSINOPHILS ABSOLUTE: 0.2 /ΜL
EOSINOPHILS RELATIVE PERCENT: 1 %
GFR CALCULATED: 51
GLUCOSE BLD-MCNC: 102 MG/DL
GLUCOSE URINE: NEGATIVE
HCT VFR BLD CALC: 40.2 % (ref 36–46)
HDLC SERPL-MCNC: 52 MG/DL (ref 35–70)
HEMOGLOBIN: 12.6 G/DL (ref 12–16)
KETONES, URINE: POSITIVE
LDL CHOLESTEROL CALCULATED: 167 MG/DL (ref 0–160)
LEUKOCYTE ESTERASE, URINE: NEGATIVE
LYMPHOCYTES ABSOLUTE: 3.7 /ΜL
LYMPHOCYTES RELATIVE PERCENT: 35 %
MCH RBC QN AUTO: 28.3 PG
MCHC RBC AUTO-ENTMCNC: 31.3 G/DL
MCV RBC AUTO: 90 FL
MONOCYTES ABSOLUTE: 0.7 /ΜL
MONOCYTES RELATIVE PERCENT: 6 %
NEUTROPHILS ABSOLUTE: 6 /ΜL
NEUTROPHILS RELATIVE PERCENT: 58 %
NITRITE, URINE: NEGATIVE
NONHDLC SERPL-MCNC: ABNORMAL MG/DL
PDW BLD-RTO: 12.6 %
PH UA: 6.5 (ref 4.5–8)
PLATELET # BLD: 339 K/ΜL
PMV BLD AUTO: NORMAL FL
POTASSIUM SERPL-SCNC: 4.1 MMOL/L
PROTEIN UA: NEGATIVE
RBC # BLD: 4.45 10^6/ΜL
SODIUM BLD-SCNC: 143 MMOL/L
SPECIFIC GRAVITY, URINE: 1.02
T4 FREE: 7.3
TOTAL PROTEIN: 6.9
TRIGL SERPL-MCNC: 175 MG/DL
URIC ACID: 4.2
UROBILINOGEN, URINE: NORMAL
VLDLC SERPL CALC-MCNC: 32 MG/DL
WBC # BLD: 10.6 10^3/ML

## 2023-07-17 ENCOUNTER — OFFICE VISIT (OUTPATIENT)
Dept: RHEUMATOLOGY | Age: 53
End: 2023-07-17
Payer: COMMERCIAL

## 2023-07-17 VITALS
BODY MASS INDEX: 33.49 KG/M2 | HEART RATE: 76 BPM | OXYGEN SATURATION: 99 % | WEIGHT: 182 LBS | SYSTOLIC BLOOD PRESSURE: 122 MMHG | DIASTOLIC BLOOD PRESSURE: 86 MMHG | HEIGHT: 62 IN

## 2023-07-17 DIAGNOSIS — M35.00 SJOGREN SYNDROME, UNSPECIFIED (HCC): Primary | ICD-10-CM

## 2023-07-17 DIAGNOSIS — Z78.9 HEALTH MAINTENANCE ALTERATION: ICD-10-CM

## 2023-07-17 PROCEDURE — 99214 OFFICE O/P EST MOD 30 MIN: CPT | Performed by: INTERNAL MEDICINE

## 2023-07-17 ASSESSMENT — ENCOUNTER SYMPTOMS
RESPIRATORY NEGATIVE: 1
GASTROINTESTINAL NEGATIVE: 1
EYES NEGATIVE: 1

## 2023-07-17 ASSESSMENT — JOINT PAIN
TOTAL NUMBER OF SWOLLEN JOINTS: 2
TOTAL NUMBER OF TENDER JOINTS: 2

## 2023-07-17 NOTE — PROGRESS NOTES
AST 17 12/16/2022 0000    ALT 14 12/16/2022 0000    BILITOT 0.3 12/16/2022 0000            Lab Results   Component Value Date    SEDRATE 25 (H) 03/30/2021    SEDRATE 20 10/02/2018    SEDRATE 17 02/15/2017    CRP 10 12/16/2022    CRP 1.0 (H) 03/30/2021       Lab Results   Component Value Date    DSDNAAB 1 03/30/2021       No results found for: C3, C4    No results found for: CCPAB, RF      RADIOLOGY / PROCEDURES:   Jan 2022   CBC wnl  Hgba1c 5.2. Anti-SS-A 1.0  Anti-SS-B <0.2   Negative : Anti-DNA DS, RNP ab, Mcclain ab, anti-scleroderma-70 ab, antichromatin, anti-shilpi-1, anto-centromere B ab      April 2022 - + LES, + SSA ab,     ASSESSMENT/PLAN:     1. Sjogren syndrome, unspecified ([address])    2. Health maintenance alteration        Sjogren syndrome -- suspected - mild joint tenderness    - intermittent arthralgia of hands, migratory intermittent paresthesia, gritty sensation of eyes, intermittent dry mouth. Reported abnormal eye testing previously. + LES and SSA ab. April 2022. - [facility] rheumatology records reviewed and summarized above. - request optometrist records. - sugar free candies, fishoil , biotene mouthwash. , prn eye drops. - breifly discussed minor salivery gland biopsy to help confirm the diagnosis of suspected sjogrens. - discussed Plaquenil - patient declined. - okay to use ibuprofen or naproxne as neeed. Prolactin elevation   - mild - ? Related to recent life stress - would repeat in the future (defer to pcp) if persistent elevation or new symptosm develop would possibly pursue an MRI and additional testing        No follow-ups on file. New Prescriptions    No medications on file       7/17/2023    Please contact the office if you have any questions or change of symptoms.

## 2023-10-26 ENCOUNTER — HOSPITAL ENCOUNTER (OUTPATIENT)
Dept: MAMMOGRAPHY | Age: 53
Discharge: HOME | End: 2023-10-26
Payer: COMMERCIAL

## 2023-10-26 DIAGNOSIS — Z12.31 VISIT FOR SCREENING MAMMOGRAM: ICD-10-CM

## 2023-10-26 PROCEDURE — 77063 BREAST TOMOSYNTHESIS BI: CPT

## 2024-04-03 ENCOUNTER — PATIENT MESSAGE (OUTPATIENT)
Dept: RHEUMATOLOGY | Age: 54
End: 2024-04-03

## 2024-04-09 NOTE — TELEPHONE ENCOUNTER
From: Latricia KARIMI  To: Gordo Mclaughlin  Sent: 4/3/2024 7:25 AM EDT  Subject: Appointment Reminder    [facility]  [address] [address]  [phone]        Hello,    Our records indicate that you have an upcoming appointment at [facility]. Please log into your [employer] and complete the visit pre-check section prior to your appointment. If you need any assistance with this process, please call our office at [phone].  This process can also be completed through your [employer] Account/Paz.     Respectfully,  [facility] staff       WHAT IS VISIT PRE-CHECK?  Visit Pre-Check saves time by letting you complete your appointment paperwork and pay your copay in advance of your appointment.  1.After signing in to [employer] paz, open My Appointments  2.Within 3 days of your scheduled appointment, the Visit Pre-Check button will appear  3.Select Visit Pre-Check  4.Verify or update your personal information  Demographics and address  Known allergies  Current medications, including preferred pharmacy  List of current health issues  Insurance coverage, including a photo of your insurance card  5.Sign your electronic consents such as Notice of Privacy Practices, Consent to Treat, Financial and Information Sharing  6.Pay your copay and outstanding balances, if eligible  7.Verify or update your patient health information  8.Submit and you are all ready for your appointment

## 2024-11-13 ENCOUNTER — HOSPITAL ENCOUNTER (OUTPATIENT)
Dept: MAMMOGRAPHY | Age: 54
Discharge: HOME | End: 2024-11-13
Payer: COMMERCIAL

## 2024-11-13 DIAGNOSIS — Z12.31 VISIT FOR SCREENING MAMMOGRAM: ICD-10-CM

## 2024-11-13 PROCEDURE — 77063 BREAST TOMOSYNTHESIS BI: CPT
